# Patient Record
(demographics unavailable — no encounter records)

---

## 2024-10-16 NOTE — PHYSICAL EXAM
[Normal] : alert, normal voice/communication, healthy appearing, no acute distress [Sclera] : the sclera and conjunctiva were normal [Hearing Threshold Finger Rub Not [place]] : hearing was normal [Normal Appearance] : the appearance of the neck was normal [No Respiratory Distress] : no respiratory distress [No Masses] : no abdominal mass palpated [Abdomen Soft] : soft [Abnormal Walk] : normal gait [Oriented To Time, Place, And Person] : oriented to person, place, and time [Abdomen Tenderness] : non-tender

## 2024-10-16 NOTE — PHYSICAL EXAM
[Normal] : alert, normal voice/communication, healthy appearing, no acute distress [Sclera] : the sclera and conjunctiva were normal [Hearing Threshold Finger Rub Not [place]] : hearing was normal [Normal Appearance] : the appearance of the neck was normal [No Respiratory Distress] : no respiratory distress [No Masses] : no abdominal mass palpated [Abdomen Soft] : soft [Abnormal Walk] : normal gait [Oriented To Time, Place, And Person] : oriented to person, place, and time [Abdomen Tenderness] : non-tender

## 2024-10-17 NOTE — ASSESSMENT
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  UC off all medications in clinical remission - in depth discussion with patient about risk of disease progression and potential malignancy if he does not have routine follow up and disease surveillance - previously on Mesalamine, off steroids since May - [facility]OPE 2022: Otto 2 disease - never completed stool studies, reorder fecal calpro today  - 5/24 Alk phos 127, would advise repeat with GGT (declines labs today) - Vitamin D 11, Rx sent. advise repeating with next lab draw - given intermittent flares, will schedule patient for colonoscopy at [facility] with Miralax. Discussed R/A/I/B with patient. Education provided on preparation instructions and the need for an escort. - +Hep A/B immunity. Negative quantiferon, heb b, hep c, hep a - Previously obtained insurance auth for ozanimod however he did not complete shingles vaccine or EKG and never initiated  - previously referred to our dieticianMirta  - Patient hesitant about suppository, will continue to hold topical 5ASA - Referred to [email or web address] for patient resource  Iron Deficiency Anemia - 5/24 Hgb 12.3 iron sat 14% ferritin 13 - not taking iron supplements - reports recent labs done at outside facility unremarkable, will have results sent to our office   HCM - PMD for vaccination (Influenza, Tdap, Hep A/B, MMR, and Varicella) - Consider DEXA if patient has continued steroid exposure, reports only 1 month exposure  - denies depression - denies NSAIDs - denies tobacco, drugs - occasional etoh - declines flu shot   RTC post procedure

## 2024-10-17 NOTE — HISTORY OF PRESENT ILLNESS
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  Patient reports he is feeling well. Has made dietary changes, drinking herbal teas. Not currently taking any medications, stopped Mesalamine after last visit and has been off prednisone since May. Denies any rectal bleeding or abdominal pain. Having 2 soft BMs daily. Reports good energy. Denies nocturnal BMs, urgency, nausea, vomiting, unintentional weight loss, dysphagia, heartburn. Denies EIMs.  Never completed shingles vaccine or ECG. Not taking iron supplement.  Recently had labs done at [facility] which were reportedly normal.  Needs a letter for [employer] stating any restrictions.    PRIOR: Has been flaring for the past month. Typically has flares that last 1-4 months and occurs every 3-4 months. Last hospitalization was over 1 year ago. In the past, made dietary changes to resolve flare. No recent steroid use. Having rectal bleeding, abdominal pain, urgency for the past few days. Having 4-5 liquid BMs daily. Baseline is 2 BMs daily. Abdominal pain is constant, worse after eating. + Nocturnal BMs. Reports poor compliance with Mesalamine taking 4x/week d/t size of capsules. Has missed two days of work. Some dizziness. Has acid reflux that is worse with certain dietary triggers.  Lost 20 lbs during Ramadan but has gained that weight back. Denies fevers, chills, dysphagia. Was living in [place] for a year, was seeing a GI provider there. Denies recent travel, new medications, antibiotics.  Reports right shoulder pain, playing basketball. Occasional small bumps in his mouth Diagnosed with Linear IGA; following with dermatologist, took course of oral steroids.  He is a   meds: mesalamine 3 tablets OTC: ibuprofen rarely NKDA PSH: denies  Colonoscopy 2/10/22: Otto 2 proctosigmoiditis, bx w colonic mucosa with mild active chronic inflammation and crypt abscess.   no prior EGD   IBD Hx: Pt reports he was diagnosed with UC a year and half ago. Reports had some sort of stool test with doctor he was seeing at [facility] who confirmed this, he reported having colonoscopy at the time.  For the last 3-4 months had rectal bleeding with mixture of watery and solid food.  has had rectal bleeding in the past but this is first time has lasted for this long  cut out cheese, nuts, and leafy greens  occasional abdominal pain  usually flares would only last a week or two, never this long  currently blood came back- went away for two weeks most recently. bright red blood  taking medication but unsure the name (mesalamine is what is listed in chart)   Denies famhx of GI cancer and IBD quit Smoking hookah denies alcohol drug use: none

## 2024-10-17 NOTE — ASSESSMENT
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  UC off all medications in clinical remission - in depth discussion with patient about risk of disease progression and potential malignancy if he does not have routine follow up and disease surveillance - previously on Mesalamine, off steroids since May - [facility]OPE 2022: Otto 2 disease - never completed stool studies, reorder fecal calpro today  - 5/24 Alk phos 127, would advise repeat with GGT (declines labs today) - Vitamin D 11, Rx sent. advise repeating with next lab draw - given intermittent flares, will schedule patient for colonoscopy at [facility] with Miralax. Discussed R/A/I/B with patient. Education provided on preparation instructions and the need for an escort. - +Hep A/B immunity. Negative quantiferon, heb b, hep c, hep a - Previously obtained insurance auth for ozanimod however he did not complete shingles vaccine or EKG and never initiated  - previously referred to our dieticianMirta  - Patient hesitant about suppository, will continue to hold topical 5ASA - Referred to [email or web address] for patient resource  Iron Deficiency Anemia - 5/24 Hgb 12.3 iron sat 14% ferritin 13 - not taking iron supplements - reports recent labs done at outside facility unremarkable, will have results sent to our office   HCM - PMD for vaccination (Influenza, Tdap, Hep A/B, MMR, and Varicella) - Consider DEXA if patient has continued steroid exposure, reports only 1 month exposure  - denies depression - denies NSAIDs - denies tobacco, drugs - occasional etoh - declines flu shot   RTC post procedure

## 2024-10-17 NOTE — END OF VISIT
[FreeTextEntry3] : Patient was seen and discussed with np cheryl edgar Note was edited and amended as necessary Patient was physically seen at [address] [Time Spent: ___ minutes] : I have spent [unfilled] minutes of time on the encounter which excludes teaching and separately reported services.

## 2024-10-17 NOTE — HISTORY OF PRESENT ILLNESS
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  Patient reports he is feeling well. Has made dietary changes, drinking herbal teas. Not currently taking any medications, stopped Mesalamine after last visit and has been off prednisone since May. Denies any rectal bleeding or abdominal pain. Having 2 soft BMs daily. Reports good energy. Denies nocturnal BMs, urgency, nausea, vomiting, unintentional weight loss, dysphagia, heartburn. Denies EIMs.  Never completed shingles vaccine or ECG. Not taking iron supplement.  Recently had labs done at [facility] which were reportedly normal.  Needs a letter for [employer] stating any restrictions.    PRIOR: Has been flaring for the past month. Typically has flares that last 1-4 months and occurs every 3-4 months. Last hospitalization was over 1 year ago. In the past, made dietary changes to resolve flare. No recent steroid use. Having rectal bleeding, abdominal pain, urgency for the past few days. Having 4-5 liquid BMs daily. Baseline is 2 BMs daily. Abdominal pain is constant, worse after eating. + Nocturnal BMs. Reports poor compliance with Mesalamine taking 4x/week d/t size of capsules. Has missed two days of work. Some dizziness. Has acid reflux that is worse with certain dietary triggers.  Lost 20 lbs during Ramadan but has gained that weight back. Denies fevers, chills, dysphagia. Was living in [place] for a year, was seeing a GI provider there. Denies recent travel, new medications, antibiotics.  Reports right shoulder pain, playing basketball. Occasional small bumps in his mouth Diagnosed with Linear IGA; following with dermatologist, took course of oral steroids.  He is a   meds: mesalamine 3 tablets OTC: ibuprofen rarely NKDA PSH: denies  Colonoscopy 2/10/22: Otto 2 proctosigmoiditis, bx w colonic mucosa with mild active chronic inflammation and crypt abscess.   no prior EGD   IBD Hx: Pt reports he was diagnosed with UC a year and half ago. Reports had some sort of stool test with doctor he was seeing at [facility] who confirmed this, he reported having colonoscopy at the time.  For the last 3-4 months had rectal bleeding with mixture of watery and solid food.  has had rectal bleeding in the past but this is first time has lasted for this long  cut out cheese, nuts, and leafy greens  occasional abdominal pain  usually flares would only last a week or two, never this long  currently blood came back- went away for two weeks most recently. bright red blood  taking medication but unsure the name (mesalamine is what is listed in chart)   Denies famhx of GI cancer and IBD quit Smoking hookah denies alcohol drug use: none